# Patient Record
Sex: FEMALE | Race: WHITE
[De-identification: names, ages, dates, MRNs, and addresses within clinical notes are randomized per-mention and may not be internally consistent; named-entity substitution may affect disease eponyms.]

---

## 2019-08-16 ENCOUNTER — HOSPITAL ENCOUNTER (OUTPATIENT)
Age: 29
End: 2019-08-16
Payer: OTHER GOVERNMENT

## 2019-08-16 DIAGNOSIS — Z34.83: Primary | ICD-10-CM

## 2019-08-16 PROCEDURE — 87653 STREP B DNA AMP PROBE: CPT

## 2019-08-17 LAB — GP B STREP DNA SPEC QL NAA+PROBE: (no result)

## 2019-09-16 ENCOUNTER — HOSPITAL ENCOUNTER (INPATIENT)
Age: 29
LOS: 2 days | Discharge: HOME | End: 2019-09-18
Payer: OTHER GOVERNMENT

## 2019-09-16 VITALS — DIASTOLIC BLOOD PRESSURE: 50 MMHG | SYSTOLIC BLOOD PRESSURE: 120 MMHG

## 2019-09-16 DIAGNOSIS — Z3A.40: ICD-10-CM

## 2019-09-16 DIAGNOSIS — D64.9: ICD-10-CM

## 2019-09-16 DIAGNOSIS — D62: ICD-10-CM

## 2019-09-16 LAB
ADD MANUAL DIFF / SLIDE REVIEW: NO
HEMATOCRIT: 36.4 % (ref 36–46)
HEMOGLOBIN: 12.2 G/DL (ref 12–16)
LYMPHOCYTES # SPEC AUTO: 2500 /UL (ref 1100–4500)
MCV RBC: 81.9 FL (ref 80–100)
MEAN CORPUSCULAR HEMOGLOBIN: 27.4 PG (ref 26–34)
MEAN CORPUSCULAR HGB CONC: 33.5 % (ref 30–36)
PLATELET COUNT: 330 X10^3/UL (ref 150–400)

## 2019-09-16 PROCEDURE — G0379 DIRECT REFER HOSPITAL OBSERV: HCPCS

## 2019-09-16 PROCEDURE — 36415 COLL VENOUS BLD VENIPUNCTURE: CPT

## 2019-09-16 PROCEDURE — 01967 NEURAXL LBR ANES VAG DLVR: CPT

## 2019-09-16 PROCEDURE — 59050 FETAL MONITOR W/REPORT: CPT

## 2019-09-16 PROCEDURE — 86850 RBC ANTIBODY SCREEN: CPT

## 2019-09-16 PROCEDURE — 85025 COMPLETE CBC W/AUTO DIFF WBC: CPT

## 2019-09-16 PROCEDURE — 85018 HEMOGLOBIN: CPT

## 2019-09-16 PROCEDURE — 59410 OBSTETRICAL CARE: CPT

## 2019-09-16 PROCEDURE — 59200 INSERT CERVICAL DILATOR: CPT

## 2019-09-16 PROCEDURE — 86900 BLOOD TYPING SEROLOGIC ABO: CPT

## 2019-09-16 PROCEDURE — 85014 HEMATOCRIT: CPT

## 2019-09-16 PROCEDURE — 86901 BLOOD TYPING SEROLOGIC RH(D): CPT

## 2019-09-17 NOTE — PM.OBPRVD
Labor & Delivery
Delivery date: 19
Intrapartal events: None
Cervical ripening method: per misoprostal protocol
Induction method: per pitocin protocol
Delivery augmentation: rupture of membranes
Delivery monitor: external FHT and external uterine
Route of delivery: 
L&D Laceration Description: None
Anesthesia type: Epidural
Complications: None
Narrative: Patient is a 29-year-old  three para two admitted at term because of gestational diabetes for induction of labor.  Patient received misoprostol cervical ripening and then Pitocin.  She had a rapid labor and spontaneous vaginal 
delivery of a live-born male infant with Apgar scores of nine at 1 minutes and nine at 5 minutes in good condition.  Placenta delivered spontaneously.  Cord had three vessels.  The estimated blood loss was 250 cc.  There were no cervical vaginal or 
perineal lacerations.  
Postpartum
Plan for aftercare: Routine

## 2019-09-17 NOTE — P.HPOB_ITS
"OB HPI    
Date/Time    
Date of admission: 19    
Date Patient Seen: 19    
Time Patient Seen: 08:35    
History of Present Condition    
Chief complaint: LABOR    
: 3    
Para: 2    
Estimated Date of Delivery: 19    
Estimated Gestational Age (weeks): 40    
Narrative: Sherri Marquez is a 29 year old female  three para two with a   
late transfer to our clinic from the [facility].  The patient is a gestational   
diabetic controlled on metformin.  Her fastings have been below 90. Her 1 hours   
have been below 120. The patient is admitted at term for induction because of   
her diabetes     
    
Indications    
Indication for induction OB: medical complication    
History of Present Pregnancy    
Prenatal care: good care, initiated at week # (11), number of visits (13) and   
pounds weight gain (16)    
Dating criteria: LMP confirmed by 2nd trimester US    
Ultrasounds: normal 1st trimester US and normal mid trimester US    
Obstetrical complications: gestational diabetes    
Preadmission Labs    
Blood type: O (+) positive    
-: Antibody screen: negative, Cystic fibrosis screen: unknown, GBS status:   
negative, HBsAG: negative, HIV: negative, HSV 1: negative, HSV 2: negative and   
RPR/VDLR: negative    
-: Chlamydia screen: detected (Negative) and Gonorrhea screen: detected   
(Negative)    
-: Rubella: immune and Varicella: immune    
HCT: 38    
HCAB: negative    
PAP: Normal    
Sequential screen: Negative    
Quad screen: Normal    
1 hr GTT: 139    
    
Evaluation    
Evaluation    
Laboratory results:             Laboratory Tests    
    
    
    
  19    
    
  19:00 19:00    
     
WBC   13.4 H    
     
RBC   4.44    
     
Hgb   12.2    
     
Hct   36.4    
     
MCV   81.9    
     
MCH   27.4    
     
MCHC   33.5    
     
RDW   14.6    
     
Plt Count   330    
     
Neut % (Auto)   72.2    
     
Lymph % (Auto)   18.6 L    
     
Mono % (Auto)   7.8    
     
Eos % (Auto)   0.5 L    
     
Baso % (Auto)   0.9    
     
Neut # (Auto)   9700 H    
     
Lymph # (Auto)   2500    
     
Mono # (Auto)   1000 H    
     
Eos # (Auto)   100    
     
Baso # (Auto)   100    
     
Blood Type  O Positive     
     
Antibody Screen  Negative     
    
    
    
    
PFSH    
Social History    
Smoking Status:  Never smoker     
    
    
Social History (Reviewed 19 @ 08:40 by Sunil Don MD)    
Smoking Status:  Never smoker     
    
    
    
Meds    
Home Medications and Allergies    
                                Home Medications    
    
    
    
 Medication  Instructions  Recorded  Confirmed  Type    
     
metformin 1,000 mg PO BID 19 History    
    
    
    
                                    Allergies    
    
    
    
Allergy/AdvReac Type Severity Reaction Status Date / Time    
     
No Known Drug Allergies Allergy Unknown  Verified 19 20:21    
    
    
    
    
Review of Systems    
Review of Systems    
ROS Unobtainable: All systems reviewed & are unremarkable except as noted in HPI  
and below    
    
Exam    
Const    
General: cooperative and healthy appearing    
[facility]    
Head: normal to inspection    
Ears: hearing grossly normal bilaterally    
Nose: external nose normal    
Face and sinus: normal facial exam    
Mouth: oral mucosae normal, lip normal, tongue normal and moist mucous membranes    
Teeth and gingiva: dentition normal    
Throat: posterior oropharynx normal    
Eyes    
General: appearance normal, both eyes and all related structures    
Neck    
Neck: normal visual inspection and full ROM    
Chest    
Chest: normal inspection of the chest and normal palpation of entire chest wall    
Breast inspection: normal inspection of the breasts and normal inspection of the  
axillae    
Breast Palpation: normal palpation of the breasts and mason
[id number]|2019 13:09:00|2019 13:09:00|GUNNAR|PRINR|Health Information Management|[id number]|" Labor & Delivery

## 2019-09-18 VITALS
RESPIRATION RATE: 16 BRPM | TEMPERATURE: 97.34 F | HEART RATE: 74 BPM | SYSTOLIC BLOOD PRESSURE: 114 MMHG | DIASTOLIC BLOOD PRESSURE: 73 MMHG

## 2019-09-18 LAB
HEMATOCRIT: 30.7 % (ref 36–46)
HEMOGLOBIN: 10.4 G/DL (ref 12–16)

## 2019-09-18 NOTE — P.DS_ITS
Discharge Providers    
Provider    
Date of admission: 19 17:53    
    
Discharge Date: 19    
Primary care physician: Genevieve Motley MD    
    
Consults:                                   
    
19 13:13    
Consult to Lactation Consultant Routine     
   Comment:     
    
    
    
Discharge provider: Sunil Don MD    
    
    
Summary    
Hospital Course    
Date Patient Seen: 19    
Time Patient Seen: 07:49    
Hospital Course:    
The patient is a 29 year  three para two who was admitted for elective   
induction on the basis of being term and a gestational diabetic.  Misoprostol   
cervical ripening was used.  Membranes are rupture andd and Pitocin was begun.    
Patient had a rapid labor and spontaneous delivery of a 9 lb male infant with   
Apgar scores of nine at 1 minutes and nine at 5 minutes in good condition.    
Placenta delivered spontaneously.  Cord had three vessels.  The estimated blood   
loss 250 cc.  There were no perineal cervical or vaginal lacerations.    
    
Post delivery the patient is done well.  She remains afebrile stable vital signs  
she has been progressivelya;limented and ambulated.  She is voiding a good   
volumes.  She will be discharged home for follow-up in four weeks    
Peripartum Data    
Infant Delivery Method: Natural Vaginal    
Laceration description: None    
Postpartum complications: none    
Status at Discharge    
Cognitive/behavioral status at discharge: oriented    
Functional status at discharge: independent ambulation    
Overall status at discharge: patient is progressing back to baseline    
Time Spent with Patient    
Time attestation: Total time spent providing and/or coordinating discharge   
services:    
    
Time spent: Less than 30 minutes    
    
Objective    
Labs    
    
Result Diagrams:     
                                                        19 06:05              
    
Labs:                    Laboratory Results - last 24 hr    
    
    
    
  19    
    
  06:05    
     
Hgb  10.4 L    
     
Hct  30.7 L    
    
    
    
    
Exam    
Narrative    
Exam Narrative: Fundus U minus two    
Lochia scant    
No perineal concerned    
    
Discharge Plan    
Discharge Plan    
Patient Disposition: Home    
    
Discharge Med Rec/Prescriptions    
Prescriptions:    
New    
  Dermoplast (with menthol) 20-0.5 % Aerosol     
   1 spray topical Q1HR PRN (Reason: perineal pain) Qty: 1 RF: 0    
  oxycodone-acetaminophen 5-325 mg Tablet     
   2 tab PO Q4HR Qty: 10 RF: 0    
  ibuprofen 600 mg Tablet     
   600 mg PO Q6HR PRN (Reason: Pain, Mild (1-3)) Qty: 16 RF: 0    
  docusate sodium 250 mg Capsule     
   250 mg PO DAILY Qty: 14 RF: 0    
  Eliezer-O-Soothe  Cream     
   1 applic topical PRN PRN (Reason: Tenderness) Qty: 1 RF: 0    
  ferrous gluconate 324 mg (38 mg iron) Tablet     
   324 mg PO DAILY Qty: 30 RF: 0    
  Prenatabs Rx 29 mg iron- 1 mg Tablet     
   1 tab PO DAILY Qty: 30 RF: 0    
    
Discontinued    
  metformin 1,000 mg Tablet     
   1,000 mg PO BID RF: 0    
    
Follow up/Referrals:    
Genevieve Motley MD [Primary Care Provider] -     
Sunil Don MD [Physician] - 1 Month    
    
Provider Discharge Instructions    
Diet: Diet as Tolerated    
    
    
Activity: up ad alex     
may shower     
    
    
    
Skin/Wound/Dressing Care    
Report to your healthcare provider any signs of infection, such as:: chills,   
fever, increased pain, unusual drainage and unusual redness    
    
    
Discharge Data    
Primary Care Provider: Genevieve Motley

## 2020-11-13 ENCOUNTER — HOSPITAL ENCOUNTER (EMERGENCY)
Dept: HOSPITAL 76 - ED | Age: 30
Discharge: HOME | End: 2020-11-13
Payer: COMMERCIAL

## 2020-11-13 VITALS — DIASTOLIC BLOOD PRESSURE: 76 MMHG | SYSTOLIC BLOOD PRESSURE: 115 MMHG

## 2020-11-13 DIAGNOSIS — E86.0: ICD-10-CM

## 2020-11-13 DIAGNOSIS — Z3A.09: ICD-10-CM

## 2020-11-13 DIAGNOSIS — O21.9: ICD-10-CM

## 2020-11-13 DIAGNOSIS — O99.281: Primary | ICD-10-CM

## 2020-11-13 LAB
ALBUMIN DIAFP-MCNC: 4.2 G/DL (ref 3.2–5.5)
ALBUMIN/GLOB SERPL: 1 {RATIO} (ref 1–2.2)
ALP SERPL-CCNC: 99 IU/L (ref 42–121)
ALT SERPL W P-5'-P-CCNC: 96 IU/L (ref 10–60)
ANION GAP SERPL CALCULATED.4IONS-SCNC: 12 MMOL/L (ref 6–13)
AST SERPL W P-5'-P-CCNC: 65 IU/L (ref 10–42)
BASOPHILS NFR BLD AUTO: 0 10^3/UL (ref 0–0.1)
BASOPHILS NFR BLD AUTO: 0.4 %
BILIRUB BLD-MCNC: 0.5 MG/DL (ref 0.2–1)
BUN SERPL-MCNC: 6 MG/DL (ref 6–20)
CALCIUM UR-MCNC: 8.9 MG/DL (ref 8.5–10.3)
CHLORIDE SERPL-SCNC: 98 MMOL/L (ref 101–111)
CLARITY UR REFRACT.AUTO: CLEAR
CO2 SERPL-SCNC: 25 MMOL/L (ref 21–32)
CREAT SERPLBLD-SCNC: 0.5 MG/DL (ref 0.4–1)
EOSINOPHIL # BLD AUTO: 0.1 10^3/UL (ref 0–0.7)
EOSINOPHIL NFR BLD AUTO: 1.9 %
ERYTHROCYTE [DISTWIDTH] IN BLOOD BY AUTOMATED COUNT: 12.9 % (ref 12–15)
GLOBULIN SER-MCNC: 4.1 G/DL (ref 2.1–4.2)
GLUCOSE SERPL-MCNC: 129 MG/DL (ref 70–100)
GLUCOSE UR QL STRIP.AUTO: NEGATIVE MG/DL
HCG UR QL: POSITIVE
HGB UR QL STRIP: 15.3 G/DL (ref 12–16)
KETONES UR QL STRIP.AUTO: NEGATIVE MG/DL
LIPASE SERPL-CCNC: 22 U/L (ref 22–51)
LYMPHOCYTES # SPEC AUTO: 1.4 10^3/UL (ref 1.5–3.5)
LYMPHOCYTES NFR BLD AUTO: 25.2 %
MCH RBC QN AUTO: 29.3 PG (ref 27–31)
MCHC RBC AUTO-ENTMCNC: 35.2 G/DL (ref 32–36)
MCV RBC AUTO: 83.3 FL (ref 81–99)
MONOCYTES # BLD AUTO: 0.4 10^3/UL (ref 0–1)
MONOCYTES NFR BLD AUTO: 7.5 %
MUCOUS THREADS URNS QL MICRO: (no result)
NEUTROPHILS # BLD AUTO: 3.5 10^3/UL (ref 1.5–6.6)
NEUTROPHILS # SNV AUTO: 5.4 X10^3/UL (ref 4.8–10.8)
NEUTROPHILS NFR BLD AUTO: 64.8 %
NITRITE UR QL STRIP.AUTO: NEGATIVE
PDW BLD AUTO: 9.6 FL (ref 7.9–10.8)
PH UR STRIP.AUTO: 6 PH (ref 5–7.5)
PLATELET # BLD: 218 10^3/UL (ref 130–450)
PROT SPEC-MCNC: 8.3 G/DL (ref 6.7–8.2)
PROT UR STRIP.AUTO-MCNC: 30 MG/DL
RBC # UR STRIP.AUTO: (no result) /UL
RBC # URNS HPF: (no result) /HPF (ref 0–5)
RBC MAR: 5.22 10^6/UL (ref 4.2–5.4)
SODIUM SERPLBLD-SCNC: 135 MMOL/L (ref 135–145)
SP GR UR STRIP.AUTO: >=1.03 (ref 1–1.03)
SQUAMOUS URNS QL MICRO: (no result)
UROBILINOGEN UR QL STRIP.AUTO: (no result) E.U./DL
UROBILINOGEN UR STRIP.AUTO-MCNC: NEGATIVE MG/DL

## 2020-11-13 PROCEDURE — 81001 URINALYSIS AUTO W/SCOPE: CPT

## 2020-11-13 PROCEDURE — 96361 HYDRATE IV INFUSION ADD-ON: CPT

## 2020-11-13 PROCEDURE — 76817 TRANSVAGINAL US OBSTETRIC: CPT

## 2020-11-13 PROCEDURE — 96374 THER/PROPH/DIAG INJ IV PUSH: CPT

## 2020-11-13 PROCEDURE — 99284 EMERGENCY DEPT VISIT MOD MDM: CPT

## 2020-11-13 PROCEDURE — 80053 COMPREHEN METABOLIC PANEL: CPT

## 2020-11-13 PROCEDURE — 85025 COMPLETE CBC W/AUTO DIFF WBC: CPT

## 2020-11-13 PROCEDURE — 81003 URINALYSIS AUTO W/O SCOPE: CPT

## 2020-11-13 PROCEDURE — 84702 CHORIONIC GONADOTROPIN TEST: CPT

## 2020-11-13 PROCEDURE — 87086 URINE CULTURE/COLONY COUNT: CPT

## 2020-11-13 PROCEDURE — 83690 ASSAY OF LIPASE: CPT

## 2020-11-13 PROCEDURE — 36415 COLL VENOUS BLD VENIPUNCTURE: CPT

## 2020-11-13 PROCEDURE — 81025 URINE PREGNANCY TEST: CPT

## 2020-11-13 PROCEDURE — 76801 OB US < 14 WKS SINGLE FETUS: CPT

## 2020-11-13 NOTE — ULTRASOUND REPORT
PROCEDURE: OB Transvaginal

 

INDICATIONS:  early pregnancy abdominal pain

 

OUTSIDE/PRIOR DATING DATA:  

Last menstrual period (LMP): On.  

LMP-based estimated date of delivery (PRITI): Unknown.  

First dating scan (date and location): 11/13/2020.  

Estimated date of delivery (PRITI) from first dating scan: 6/17/2021.  

 

TECHNIQUE:  

Real-time scanning was performed of the fetus and maternal pelvic organs, with image documentation.  


 

COMPARISON:  None

 

FINDINGS:     

 

Embryo:  Crown-rump length is identified measuring 2.44 cm corresponding to 9 weeks 1 day. Heart rate
 measures 187 bpm. Placenta is noted to be anterior.  No subchorionic hemorrhage.

 

Measurement variability in dating:  +/- 4 weeks by LMP, +/- 7 days by mean sac diameter (use before 6
 weeks gestation if crown-rump length not able to be measured), +/- 5 days by crown-rump length (6-12
 weeks gestation).  

 

Maternal organs:  Ovaries demonstrate what appears to be a left corpus luteal cyst. Nabothian cysts a
re noted..  Limited images through the kidneys demonstrate no hydronephrosis.  

 

IMPRESSION:  

Single live intrauterine pregnancy with ultrasound gestational age of 9 weeks 1 day corresponding to 
ultrasound PRITI of 6/17/2020.

 

Recommend follow-up imaging at 20-22 weeks for dates and anatomy.

 

The above findings are concordant with preliminary report.

 

Reviewed by: Lakia Hayes MD on 11/13/2020 1:24 PM PST

Approved by: Lakia Hayes MD on 11/13/2020 1:24 PM PST

 

 

Station ID:  [id number]

## 2020-11-13 NOTE — ULTRASOUND REPORT
PROCEDURE:  OB First Trimester

 

INDICATIONS:  early pregnancy abdominal pain

 

OUTSIDE/PRIOR DATING DATA:  

Last menstrual period (LMP): On.  

LMP-based estimated date of delivery (PRITI): Unknown.  

First dating scan (date and location): 11/13/2020.  

Estimated date of delivery (PRITI) from first dating scan: 6/17/2021.  

 

TECHNIQUE:  

Real-time scanning was performed of the fetus and maternal pelvic organs, with image documentation.  


 

COMPARISON:  None

 

FINDINGS:     

 

Embryo:  Crown-rump length is identified measuring 2.44 cm corresponding to 9 weeks 1 day. Heart rate
 measures 187 bpm. Placenta is noted to be anterior.  No subchorionic hemorrhage.

 

Measurement variability in dating:  +/- 4 weeks by LMP, +/- 7 days by mean sac diameter (use before 6
 weeks gestation if crown-rump length not able to be measured), +/- 5 days by crown-rump length (6-12
 weeks gestation).  

 

Maternal organs:  Ovaries demonstrate what appears to be a left corpus luteal cyst. Nabothian cysts a
re noted..  Limited images through the kidneys demonstrate no hydronephrosis.  

 

IMPRESSION:  

Single live intrauterine pregnancy with ultrasound gestational age of 9 weeks 1 day coarse 9 to ultra
sound PRITI of 6/17/2020.

 

Recommend follow-up imaging at 20-22 weeks for dates and anatomy.

 

The above findings are concordant with preliminary report.

 

Reviewed by: Lakia Hayes MD on 11/13/2020 1:24 PM PST

Approved by: Lakia Hayes MD on 11/13/2020 1:24 PM PST

 

 

Station ID:  [id number]

## 2020-11-13 NOTE — ED PHYSICIAN DOCUMENTATION
PD HPI ABD PAIN





- Stated complaint


Stated Complaint: C+, VOMITING





- Chief complaint


Chief Complaint: Abd Pain





- History obtained from


History obtained from: Patient, Family





- History of Present Illness


Timing - onset: How many weeks ago (1)


Timing - duration: Weeks (1)


Timing - details: Gradual onset, Still present


Quality: Sharp, Pain


Location: Periumbilical


Improved by: Vomiting


Worsened by: Position, Palpation


Associated symptoms: Nausea, Vomiting.  No: Fever


Similar symptoms before: Has not had sx before


Recently seen: Other





- Additional information


Additional information: 





30-year-old female who believes she may be 10 weeks pregnant has Covid and she 

has been sick for almost 2 weeks.  She has developed nausea and vomiting and 

abdominal pain that is more pronounced than what she has had previously with her

pregnancies.  She has coughing paroxysms and when she has these paroxysms she 

has pain in the lower abdomen and pelvis along the inguinal crease.





She has not been in to see her doctor as yet for the pregnancy itself.





Review of Systems


Constitutional: reports: Myalgias, Fatigue, Sweats.  denies: Fever


Eyes: denies: Decreased vision


Ears: denies: Ear pain


Nose: denies: Congestion


Throat: denies: Sore throat


Cardiac: denies: Chest pain / pressure, Palpitations, Pedal edema


Respiratory: reports: Dyspnea, Cough


GI: reports: Abdominal Pain, Nausea, Vomiting


: denies: Dysuria, Frequency





PD PAST MEDICAL HISTORY





- Present Medications


Home Medications: 


                                Ambulatory Orders











 Medication  Instructions  Recorded  Confirmed


 


Ondansetron Odt [Zofran] 4 mg TL Q6H PRN #10 tablet 20 














- Allergies


Allergies/Adverse Reactions: 


                                    Allergies











Allergy/AdvReac Type Severity Reaction Status Date / Time


 


No Known Drug Allergies Allergy   Verified 20 00:26














- Social History


Does the pt smoke?: No


Smoking Status: Never smoker





PD ED PE NORMAL





- Vitals


Vital signs reviewed: Yes (tachy and hypertensive)





- General


General: Alert and oriented X 3, No acute distress, Well developed/nourished





- HEENT


HEENT: Atraumatic, PERRL, EOMI





- Respiratory


Respiratory: No respiratory distress





- Abdomen


Abdomen: Normal bowel sounds, Soft, Non distended, No organomegaly, Other (mild 

tenderness without garding )





- Derm


Derm: Normal color, Warm and dry, No rash





- Extremities


Extremities: No deformity, No edema





- Neuro


Neuro: Alert and oriented X 3, CNs 2-12 intact, No motor deficit, No sensory 

deficit, Normal speech


Eye Opening: Spontaneous


Motor: Obeys Commands


Verbal: Oriented


GCS Score: 15





- Psych


Psych: Normal mood, Normal affect





Results





- Vitals


Vitals: 


                               Vital Signs - 24 hr











  20





  00:22 00:45 02:26


 


Temperature 36.8 C 36.8 C 


 


Heart Rate 108 H 108 H 118 H


 


Respiratory 18 18 





Rate   


 


Blood Pressure 134/99 H 134/99 H 132/79 H


 


O2 Saturation 97 97 99














  20





  04:01


 


Temperature 


 


Heart Rate 95


 


Respiratory 





Rate 


 


Blood Pressure 115/76


 


O2 Saturation 97








                                     Oxygen











O2 Source                      Room air

















- Labs


Labs: 


                                Laboratory Tests











  20





  00:30 00:40 00:40


 


WBC   5.4 


 


RBC   5.22 


 


Hgb   15.3 


 


Hct   43.5 


 


MCV   83.3 


 


MCH   29.3 


 


MCHC   35.2 


 


RDW   12.9 


 


Plt Count   218 


 


MPV   9.6 


 


Neut # (Auto)   3.5 


 


Lymph # (Auto)   1.4 L 


 


Mono # (Auto)   0.4 


 


Eos # (Auto)   0.1 


 


Baso # (Auto)   0.0 


 


Absolute Nucleated RBC   0.00 


 


Nucleated RBC %   0.0 


 


Sodium    135


 


Potassium    3.1 L


 


Chloride    98 L


 


Carbon Dioxide    25


 


Anion Gap    12.0


 


BUN    6


 


Creatinine    0.5


 


Estimated GFR (MDRD)    144


 


Glucose    129 H


 


Calcium    8.9


 


Total Bilirubin    0.5


 


AST    65 H


 


ALT    96 H


 


Alkaline Phosphatase    99


 


Total Protein    8.3 H


 


Albumin    4.2


 


Globulin    4.1


 


Albumin/Globulin Ratio    1.0


 


Lipase    22


 


HCG, Quant   


 


Urine Color  YELLOW  


 


Urine Clarity  CLEAR  


 


Urine pH  6.0  


 


Ur Specific Gravity  >=1.030 H  


 


Urine Protein  30 H  


 


Urine Glucose (UA)  NEGATIVE  


 


Urine Ketones  NEGATIVE  


 


Urine Occult Blood  TRACE-INTA  


 


Urine Nitrite  NEGATIVE  


 


Urine Bilirubin  NEGATIVE  


 


Urine Urobilinogen  0.2 (NORMAL)  


 


Ur Leukocyte Esterase  NEGATIVE  


 


Urine RBC  0-5  


 


Urine WBC  0-3  


 


Ur Squamous Epith Cells  FEW Squamous  


 


Urine Bacteria  Rare  


 


Urine Mucus  Few Strands  


 


Ur Microscopic Review  INDICATED  


 


Urine Culture Comments  NOT INDICATED  


 


Urine HCG, Qual  POSITIVE  














  20





  00:40


 


WBC 


 


RBC 


 


Hgb 


 


Hct 


 


MCV 


 


MCH 


 


MCHC 


 


RDW 


 


Plt Count 


 


MPV 


 


Neut # (Auto) 


 


Lymph # (Auto) 


 


Mono # (Auto) 


 


Eos # (Auto) 


 


Baso # (Auto) 


 


Absolute Nucleated RBC 


 


Nucleated RBC % 


 


Sodium 


 


Potassium 


 


Chloride 


 


Carbon Dioxide 


 


Anion Gap 


 


BUN 


 


Creatinine 


 


Estimated GFR (MDRD) 


 


Glucose 


 


Calcium 


 


Total Bilirubin 


 


AST 


 


ALT 


 


Alkaline Phosphatase 


 


Total Protein 


 


Albumin 


 


Globulin 


 


Albumin/Globulin Ratio 


 


Lipase 


 


HCG, Quant  884990.00


 


Urine Color 


 


Urine Clarity 


 


Urine pH 


 


Ur Specific Gravity 


 


Urine Protein 


 


Urine Glucose (UA) 


 


Urine Ketones 


 


Urine Occult Blood 


 


Urine Nitrite 


 


Urine Bilirubin 


 


Urine Urobilinogen 


 


Ur Leukocyte Esterase 


 


Urine RBC 


 


Urine WBC 


 


Ur Squamous Epith Cells 


 


Urine Bacteria 


 


Urine Mucus 


 


Ur Microscopic Review 


 


Urine Culture Comments 


 


Urine HCG, Qual 














PD MEDICAL DECISION MAKING





- ED course


Complexity details: reviewed results, re-evaluated patient, considered 

differential, d/w patient, d/w family


ED course: 





30-year-old  4 para 3 in the early stages of pregnancy has not had an 

ultrasound with this pregnancy is that she is experiencing abdominal pain all 

well as well as nausea and vomiting.  She has had significant vomiting more than

 her previous pregnancies and she is found to be dehydrated.  She is 

administered saline and has improvement.  I suspect her abdominal pain is 

related to her coughing paroxysms and she feels this way as well.  We were able 

to obtain fetal ultrasound demonstrating a 9-week 1 day fetus with activity and 

a fetal heart rate of 180.  The patient feels much better after hydration and 

she is administered potassium for potassium of 3.1.  We will send her home with 

some Zofran.





Departure





- Departure


Disposition: 01 Home, Self Care


Clinical Impression: 


 Early stage of pregnancy, Dehydration





Abdominal pain


Qualifiers:


 Abdominal location: generalized Qualified Code(s): R10.84 - Generalized 

abdominal pain





Vomiting


Qualifiers:


 Vomiting type: unspecified Vomiting Intractability: non-intractable Nausea 

presence: with nausea Qualified Code(s): R11.2 - Nausea with vomiting, 

unspecified





Condition: Stable


Instructions:  ED Dehydration, ED Preg Morning Sickness, ED Strain Abdominal 

Muscle


Follow-Up: 


MIKKI CHAPA ARNP [Primary Care Provider] - 


Prescriptions: 


Ondansetron Odt [Zofran] 4 mg TL Q6H PRN #10 tablet


 PRN Reason: Nausea / Vomiting


Discharge Date/Time: 20 04:45

## 2021-03-02 ENCOUNTER — HOSPITAL ENCOUNTER (OUTPATIENT)
Age: 31
End: 2021-03-02
Payer: OTHER GOVERNMENT

## 2021-03-02 DIAGNOSIS — Z34.82: Primary | ICD-10-CM

## 2021-03-02 DIAGNOSIS — Z3A.25: ICD-10-CM

## 2021-03-02 PROCEDURE — 76815 OB US LIMITED FETUS(S): CPT

## 2021-03-02 NOTE — DI.US.S_ITS
PROCEDURE:  US OB LIMITED  
   
INDICATIONS:  growth scan with JASMIN, possible IUGR on anatomy scan  
   
OUTSIDE/PRIOR DATING DATA:    
First dating scan (date and location):  3/2/2021 .    
Estimated date of delivery (NATTY) from first dating scan:  6/9/2021 .    
   
TECHNIQUE:   
Real-time scanning was performed of the fetus, with image documentation and biometric   
measurements.    
Endovaginal scanning:  No  
   
COMPARISON:  None available at time of interpretation.  
   
FINDINGS:    
   
General:  A single living intrauterine gestation is present.    
Presentation:  Breech.    
Placenta:  Placental position is anterior , without previa.    
Amniotic fluid index:  14.1 cm, normal range is 5-24 cm.    
Fetal heart rate:  163 beats per minute.    
Maternal cervical canal:  Not well seen.  
   
Fetal biometrics:    
Biparietal diameter:  25 weeks 4 days  
Head circumference:  25 weeks 6 days  
Abdominal circumference:  26 weeks 1 day  
Femur length:  25 weeks 6 days  
Estimated gestational age from initial scan: not applicable.    
Composite gestational age from present scan:  25 weeks 6 days  
Estimated fetal weight and percentile:  880 g  
Measurement variability for biometric dating: +/- 7 days from 14 weeks to 15 weeks 6 days   
gestation, +/- 10 days from 16 weeks to 21 weeks 6 days gestation, +/- 2 weeks from 22   
weeks to 27 weeks 6 days gestation, +/- 3 weeks for 28 weeks gestation or later.    
Fetal weight reference: 4500 g or EFW >90/95% is considered macrosomia or large for   
gestational age.  EFW <10% is small for gestational age.  EFW 5% or less is considered   
intra-uterine growth restriction.    
   
Other:  Limited anatomic survey performed.  
   
IMPRESSION:    
   
1. Single living IUP identified with mean composite gestational age of 25 weeks 6 days   
corresponding to ultrasound NATTY of 6/9/2021.  
   
   
Dictated by: Sammy Tapia [facility] Interpreted: Kasia Pop MD on 3/02/2021 at 9:34       
Approved by: Kasia Pop M.D. on 3/02/2021 at 13:31

## 2021-04-16 ENCOUNTER — HOSPITAL ENCOUNTER (OUTPATIENT)
Age: 31
End: 2021-04-16
Payer: OTHER GOVERNMENT

## 2021-04-16 DIAGNOSIS — E03.9: ICD-10-CM

## 2021-04-16 DIAGNOSIS — E05.90: Primary | ICD-10-CM

## 2021-04-16 LAB
T3FREE SERPL-MCNC: 2.51 PG/ML (ref 2.77–5.27)
T4 FREE SERPL-MCNC: 0.8 NG/DL (ref 0.78–2.19)
TSH SERPL DL<=0.05 MIU/L-ACNC: 0.32 UIU/ML (ref 0.47–4.68)

## 2021-04-16 PROCEDURE — 36415 COLL VENOUS BLD VENIPUNCTURE: CPT

## 2021-04-16 PROCEDURE — 84443 ASSAY THYROID STIM HORMONE: CPT

## 2021-04-16 PROCEDURE — 84439 ASSAY OF FREE THYROXINE: CPT

## 2021-04-16 PROCEDURE — 84481 FREE ASSAY (FT-3): CPT

## 2021-05-14 ENCOUNTER — HOSPITAL ENCOUNTER (OUTPATIENT)
Age: 31
End: 2021-05-14
Payer: OTHER GOVERNMENT

## 2021-05-14 DIAGNOSIS — Z3A.36: ICD-10-CM

## 2021-05-14 DIAGNOSIS — Z34.90: Primary | ICD-10-CM

## 2021-05-14 PROCEDURE — 87653 STREP B DNA AMP PROBE: CPT

## 2021-05-15 LAB — GP B STREP DNA SPEC QL NAA+PROBE: (no result)

## 2021-05-20 ENCOUNTER — HOSPITAL ENCOUNTER (OUTPATIENT)
Age: 31
End: 2021-05-20
Payer: OTHER GOVERNMENT

## 2021-05-20 DIAGNOSIS — Z3A.36: ICD-10-CM

## 2021-05-20 DIAGNOSIS — O36.5930: ICD-10-CM

## 2021-05-20 PROCEDURE — 76815 OB US LIMITED FETUS(S): CPT

## 2021-05-26 ENCOUNTER — HOSPITAL ENCOUNTER (OUTPATIENT)
Dept: HOSPITAL 73 - OB | Age: 31
Discharge: HOME | End: 2021-05-26
Payer: OTHER GOVERNMENT

## 2021-05-26 DIAGNOSIS — O36.8130: Primary | ICD-10-CM

## 2021-05-26 DIAGNOSIS — Z3A.38: ICD-10-CM

## 2021-05-26 PROCEDURE — 59025 FETAL NON-STRESS TEST: CPT

## 2021-05-26 PROCEDURE — G0379 DIRECT REFER HOSPITAL OBSERV: HCPCS

## 2021-05-26 PROCEDURE — 84112 EVAL AMNIOTIC FLUID PROTEIN: CPT

## 2021-05-26 PROCEDURE — G0378 HOSPITAL OBSERVATION PER HR: HCPCS

## 2021-06-09 ENCOUNTER — HOSPITAL ENCOUNTER (INPATIENT)
Age: 31
LOS: 1 days | Discharge: HOME | End: 2021-06-10
Payer: OTHER GOVERNMENT

## 2021-06-09 VITALS — SYSTOLIC BLOOD PRESSURE: 117 MMHG | DIASTOLIC BLOOD PRESSURE: 75 MMHG

## 2021-06-09 DIAGNOSIS — Z3A.40: ICD-10-CM

## 2021-06-09 LAB
ADD MANUAL DIFF / SLIDE REVIEW: NO
HEMATOCRIT: 35.8 % (ref 36–46)
HEMOGLOBIN: 11.9 G/DL (ref 12–16)
LYMPHOCYTES # SPEC AUTO: 2100 /UL (ref 1100–4500)
MCV RBC: 82.8 FL (ref 80–100)
MEAN CORPUSCULAR HEMOGLOBIN: 27.6 PG (ref 26–34)
MEAN CORPUSCULAR HGB CONC: 33.3 % (ref 30–36)
PLATELET COUNT: 294 X10^3/UL (ref 150–400)

## 2021-06-09 PROCEDURE — G0379 DIRECT REFER HOSPITAL OBSERV: HCPCS

## 2021-06-09 PROCEDURE — 59410 OBSTETRICAL CARE: CPT

## 2021-06-09 PROCEDURE — 86850 RBC ANTIBODY SCREEN: CPT

## 2021-06-09 PROCEDURE — 86901 BLOOD TYPING SEROLOGIC RH(D): CPT

## 2021-06-09 PROCEDURE — 85025 COMPLETE CBC W/AUTO DIFF WBC: CPT

## 2021-06-09 PROCEDURE — 59050 FETAL MONITOR W/REPORT: CPT

## 2021-06-09 PROCEDURE — 86900 BLOOD TYPING SEROLOGIC ABO: CPT

## 2021-06-09 PROCEDURE — 87635 SARS-COV-2 COVID-19 AMP PRB: CPT

## 2021-06-09 PROCEDURE — 36415 COLL VENOUS BLD VENIPUNCTURE: CPT

## 2021-06-09 PROCEDURE — 01967 NEURAXL LBR ANES VAG DLVR: CPT

## 2021-06-10 VITALS
HEART RATE: 92 BPM | RESPIRATION RATE: 18 BRPM | SYSTOLIC BLOOD PRESSURE: 117 MMHG | TEMPERATURE: 97.5 F | DIASTOLIC BLOOD PRESSURE: 75 MMHG

## 2023-04-05 ENCOUNTER — HOSPITAL ENCOUNTER (EMERGENCY)
Age: 33
LOS: 1 days | Discharge: HOME | End: 2023-04-06
Payer: OTHER GOVERNMENT

## 2023-04-05 VITALS
SYSTOLIC BLOOD PRESSURE: 138 MMHG | TEMPERATURE: 97.7 F | DIASTOLIC BLOOD PRESSURE: 82 MMHG | HEART RATE: 69 BPM | RESPIRATION RATE: 20 BRPM | OXYGEN SATURATION: 99 %

## 2023-04-05 VITALS — HEART RATE: 66 BPM | OXYGEN SATURATION: 98 % | RESPIRATION RATE: 21 BRPM

## 2023-04-05 VITALS — OXYGEN SATURATION: 98 % | RESPIRATION RATE: 16 BRPM | HEART RATE: 72 BPM

## 2023-04-05 VITALS — HEART RATE: 65 BPM | OXYGEN SATURATION: 97 % | RESPIRATION RATE: 20 BRPM

## 2023-04-05 VITALS — RESPIRATION RATE: 24 BRPM | OXYGEN SATURATION: 99 % | HEART RATE: 67 BPM

## 2023-04-05 VITALS — RESPIRATION RATE: 19 BRPM | HEART RATE: 64 BPM | OXYGEN SATURATION: 98 %

## 2023-04-05 VITALS — BODY MASS INDEX: 44.2 KG/M2

## 2023-04-05 DIAGNOSIS — R07.89: Primary | ICD-10-CM

## 2023-04-05 LAB
ADD MANUAL DIFF / SLIDE REVIEW: NO
ALBUMIN SERPL-MCNC: 4.1 G/DL (ref 3.5–5)
ALBUMIN/GLOB SERPL: 1.2 {RATIO} (ref 1–2.8)
ALP SERPL-CCNC: 90 U/L (ref 38–126)
ALT SERPL-CCNC: 48 IU/L (ref ?–35)
BUN SERPL-MCNC: 12 MG/DL (ref 7–17)
CALCIUM SERPL-MCNC: 8.9 MG/DL (ref 8.4–10.2)
CHLORIDE SERPL-SCNC: 102 MMOL/L (ref 98–107)
CK SERPL-CCNC: 98 U/L (ref 30–135)
CKMB % RELATIVE INDEX: (no result) % (ref 1.5–5)
CO2 SERPL-SCNC: 26 MMOL/L (ref 22–32)
ESTIMATED GLOMERULAR FILT RATE: > 60 ML/MIN (ref 60–?)
GLOBULIN SER CALC-MCNC: 3.5 G/DL (ref 1.7–4.1)
GLUCOSE SERPL-MCNC: 100 MG/DL (ref 70–100)
HEMATOCRIT: 38.4 % (ref 36–46)
HEMOGLOBIN: 13.1 G/DL (ref 12–16)
HEMOLYSIS: < 15 (ref 0–50)
INR PPP: 1 (ref 0.9–1.3)
LIPASE SERPL-CCNC: 38 U/L (ref 23–300)
LYMPHOCYTES # SPEC AUTO: 3700 /UL (ref 1100–4500)
MAGNESIUM SERPL-MCNC: 1.9 MG/DL (ref 1.6–2.3)
MCV RBC: 82.4 FL (ref 80–100)
MEAN CORPUSCULAR HEMOGLOBIN: 28.1 PG (ref 26–34)
MEAN CORPUSCULAR HGB CONC: 34 % (ref 30–36)
PLATELET COUNT: 316 X10^3/UL (ref 150–400)
POTASSIUM SERPL-SCNC: 4 MMOL/L (ref 3.4–5.1)
PROT SERPL-MCNC: 7.6 G/DL (ref 6.3–8.2)
PROTHROMBIN TIME: 11.8 SECONDS (ref 10.1–12.7)
PTT PARTIAL THROMBOPLASTIN TIM: 34 SECONDS (ref 26–36)
SODIUM SERPL-SCNC: 135 MMOL/L (ref 137–145)
TROPONIN I SERPL-MCNC: < 0.012 NG/ML (ref 0.01–0.03)
TROPONIN I SERPL-MCNC: < 0.012 NG/ML (ref 0.01–0.03)

## 2023-04-05 PROCEDURE — 93005 ELECTROCARDIOGRAM TRACING: CPT

## 2023-04-05 PROCEDURE — 83735 ASSAY OF MAGNESIUM: CPT

## 2023-04-05 PROCEDURE — 80053 COMPREHEN METABOLIC PANEL: CPT

## 2023-04-05 PROCEDURE — 86140 C-REACTIVE PROTEIN: CPT

## 2023-04-05 PROCEDURE — 99284 EMERGENCY DEPT VISIT MOD MDM: CPT

## 2023-04-05 PROCEDURE — 85651 RBC SED RATE NONAUTOMATED: CPT

## 2023-04-05 PROCEDURE — 85025 COMPLETE CBC W/AUTO DIFF WBC: CPT

## 2023-04-05 PROCEDURE — 85379 FIBRIN DEGRADATION QUANT: CPT

## 2023-04-05 PROCEDURE — 71045 X-RAY EXAM CHEST 1 VIEW: CPT

## 2023-04-05 PROCEDURE — 85730 THROMBOPLASTIN TIME PARTIAL: CPT

## 2023-04-05 PROCEDURE — 85610 PROTHROMBIN TIME: CPT

## 2023-04-05 PROCEDURE — 84484 ASSAY OF TROPONIN QUANT: CPT

## 2023-04-05 PROCEDURE — 99283 EMERGENCY DEPT VISIT LOW MDM: CPT

## 2023-04-05 PROCEDURE — 82550 ASSAY OF CK (CPK): CPT

## 2023-04-05 PROCEDURE — 36415 COLL VENOUS BLD VENIPUNCTURE: CPT

## 2023-04-05 PROCEDURE — 83690 ASSAY OF LIPASE: CPT

## 2023-04-06 VITALS
OXYGEN SATURATION: 99 % | RESPIRATION RATE: 16 BRPM | HEART RATE: 65 BPM | DIASTOLIC BLOOD PRESSURE: 58 MMHG | SYSTOLIC BLOOD PRESSURE: 119 MMHG